# Patient Record
Sex: FEMALE | Race: WHITE | Employment: STUDENT | URBAN - NONMETROPOLITAN AREA
[De-identification: names, ages, dates, MRNs, and addresses within clinical notes are randomized per-mention and may not be internally consistent; named-entity substitution may affect disease eponyms.]

---

## 2020-02-12 ENCOUNTER — OFFICE VISIT (OUTPATIENT)
Dept: FAMILY MEDICINE CLINIC | Age: 20
End: 2020-02-12
Payer: COMMERCIAL

## 2020-02-12 ENCOUNTER — HOSPITAL ENCOUNTER (OUTPATIENT)
Dept: CT IMAGING | Age: 20
Discharge: HOME | End: 2020-02-12
Payer: COMMERCIAL

## 2020-02-12 ENCOUNTER — TELEPHONE (OUTPATIENT)
Dept: FAMILY MEDICINE CLINIC | Age: 20
End: 2020-02-12

## 2020-02-12 VITALS
HEART RATE: 88 BPM | HEIGHT: 72 IN | SYSTOLIC BLOOD PRESSURE: 104 MMHG | DIASTOLIC BLOOD PRESSURE: 70 MMHG | WEIGHT: 181.6 LBS | BODY MASS INDEX: 24.6 KG/M2

## 2020-02-12 PROBLEM — S02.2XXD CLOSED FRACTURE OF NASAL BONE WITH ROUTINE HEALING: Status: ACTIVE | Noted: 2020-02-12

## 2020-02-12 PROCEDURE — 99202 OFFICE O/P NEW SF 15 MIN: CPT | Performed by: FAMILY MEDICINE

## 2020-02-12 PROCEDURE — 70486 CT MAXILLOFACIAL W/O DYE: CPT

## 2020-02-12 ASSESSMENT — PATIENT HEALTH QUESTIONNAIRE - PHQ9
SUM OF ALL RESPONSES TO PHQ QUESTIONS 1-9: 0
2. FEELING DOWN, DEPRESSED OR HOPELESS: 0
SUM OF ALL RESPONSES TO PHQ QUESTIONS 1-9: 0
1. LITTLE INTEREST OR PLEASURE IN DOING THINGS: 0
SUM OF ALL RESPONSES TO PHQ9 QUESTIONS 1 & 2: 0

## 2020-02-12 ASSESSMENT — ENCOUNTER SYMPTOMS
SORE THROAT: 0
RHINORRHEA: 0

## 2020-02-12 NOTE — PROGRESS NOTES
(ACWY) vaccine  Aged Out    Pneumococcal 0-64 years Vaccine  Aged Out       Objective:  /70 (Site: Left Upper Arm, Position: Sitting, Cuff Size: Medium Adult)   Pulse 88   Ht 6' (1.829 m)   Wt 181 lb 9.6 oz (82.4 kg)   BMI 24.63 kg/m²   Physical Exam  Vitals signs reviewed. Constitutional:       Appearance: She is not ill-appearing. HENT:      Nose: Nasal deformity and nasal tenderness present. No laceration or rhinorrhea. Right Nostril: No septal hematoma. Left Nostril: No septal hematoma. Mouth/Throat:      Mouth: Mucous membranes are moist.      Pharynx: Oropharynx is clear. Cardiovascular:      Rate and Rhythm: Normal rate and regular rhythm. Heart sounds: No murmur. Pulmonary:      Effort: Pulmonary effort is normal.      Breath sounds: Normal breath sounds. No wheezing or rhonchi. Neurological:      Mental Status: She is alert and oriented to person, place, and time. Mental status is at baseline. Psychiatric:         Mood and Affect: Mood normal.         Behavior: Behavior normal.     Has tenderness of the nasal bone. Slight deformity of the nose to the right. Has ecchymosis of the bilateral inferior orbital rim/maxillary area    Impression/Plan:  1. Nose pain  -Nose pain after trauma with mild deformity and tenderness. Suspect nasal fracture. No septal hematoma. Will do stat CT of the face to further evaluate.  -If nasal fracture, will refer to ENT. -Needs to wear protective facemask when playing basketball  -Will discuss with [facility] at [address]; Future      They voiced understanding. All questions answered. They agreed with treatment plan. See patient instructions for any educational materials that may have been given. Discussed use, benefit, and side effects of prescribed medications. Reviewed health maintenance.     (Please note that portions of this note may have been completed with a voice recognition program.  Efforts were made to edit the dictation but occasionally words are mis-transcribed.)    Return if symptoms worsen or fail to improve.       Electronically signed by Sahara Melvin MD on 2/12/2020 at 2:52 PM

## 2020-02-12 NOTE — PATIENT INSTRUCTIONS
· You passed out (lost consciousness).    Call your doctor now or seek immediate medical care if:    · You have signs of infection, such as:  ? Increased pain, swelling, warmth, or redness. ? Red streaks leading from the area. ? Pus draining from the area. ? A fever.     · You have clear fluid draining from your nose.     · You have vision changes.     · Your nose is bleeding.     · You have new or worse pain.    Watch closely for changes in your health, and be sure to contact your doctor if:    · You do not get better as expected. Where can you learn more? Go to [email or web address] and sign in to your [employer] account. Enter Y345 in the Meuugame box to learn more about \"Broken Nose: Care Instructions. \"     If you do not have an account, please click on the \"Sign Up Now\" link. Current as of: June 26, 2019  Content Version: 12.3  © 1289-7100 [employer]. Care instructions adapted under license by [facility] ([facility]). If you have questions about a medical condition or this instruction, always ask your healthcare professional. [place] [address] any warranty or liability for your use of this information.

## 2020-02-12 NOTE — TELEPHONE ENCOUNTER
Urgent referral placed to ENT for nasal fracture. [facility] will [facility].

## 2020-02-14 NOTE — TELEPHONE ENCOUNTER
Patient refused to schedule at [facility] ENT. Referral Notes      Date Time Type Summary User   2/14/2020 12:25 PM EST General Patient called the office stating that she has an appointment with her regular ENT office, and does not need to be seen here at this office. Amanuel Cancino   Note Text:   Patient called the office stating that she has an appointment with her regular ENT office, and does not need to be seen here at this office.